# Patient Record
Sex: FEMALE | Race: WHITE | NOT HISPANIC OR LATINO | Employment: OTHER | URBAN - METROPOLITAN AREA
[De-identification: names, ages, dates, MRNs, and addresses within clinical notes are randomized per-mention and may not be internally consistent; named-entity substitution may affect disease eponyms.]

---

## 2017-08-03 PROBLEM — N63.0 LUMP OR MASS IN BREAST: Status: ACTIVE | Noted: 2017-08-03

## 2018-04-22 ENCOUNTER — OFFICE VISIT (OUTPATIENT)
Dept: URGENT CARE | Facility: CLINIC | Age: 63
End: 2018-04-22
Payer: MEDICARE

## 2018-04-22 VITALS
OXYGEN SATURATION: 98 % | HEIGHT: 61 IN | DIASTOLIC BLOOD PRESSURE: 79 MMHG | BODY MASS INDEX: 27.94 KG/M2 | HEART RATE: 84 BPM | WEIGHT: 148 LBS | TEMPERATURE: 98 F | RESPIRATION RATE: 18 BRPM | SYSTOLIC BLOOD PRESSURE: 131 MMHG

## 2018-04-22 DIAGNOSIS — J01.00 ACUTE NON-RECURRENT MAXILLARY SINUSITIS: Primary | ICD-10-CM

## 2018-04-22 PROCEDURE — 99203 OFFICE O/P NEW LOW 30 MIN: CPT | Mod: 25,S$GLB,, | Performed by: PHYSICIAN ASSISTANT

## 2018-04-22 PROCEDURE — 96372 THER/PROPH/DIAG INJ SC/IM: CPT | Mod: S$GLB,,, | Performed by: FAMILY MEDICINE

## 2018-04-22 RX ORDER — MELOXICAM 15 MG/1
TABLET ORAL
COMMUNITY
Start: 2018-04-19 | End: 2019-01-21

## 2018-04-22 RX ORDER — BETAMETHASONE SODIUM PHOSPHATE AND BETAMETHASONE ACETATE 3; 3 MG/ML; MG/ML
6 INJECTION, SUSPENSION INTRA-ARTICULAR; INTRALESIONAL; INTRAMUSCULAR; SOFT TISSUE
Status: COMPLETED | OUTPATIENT
Start: 2018-04-22 | End: 2018-04-22

## 2018-04-22 RX ORDER — MONTELUKAST SODIUM 10 MG/1
TABLET ORAL
COMMUNITY
Start: 2018-04-10 | End: 2019-01-21

## 2018-04-22 RX ADMIN — BETAMETHASONE SODIUM PHOSPHATE AND BETAMETHASONE ACETATE 6 MG: 3; 3 INJECTION, SUSPENSION INTRA-ARTICULAR; INTRALESIONAL; INTRAMUSCULAR; SOFT TISSUE at 02:04

## 2018-04-22 NOTE — PATIENT INSTRUCTIONS
Sinusitis (No Antibiotics)    The sinuses are air-filled spaces within the bones of the face. They connect to the inside of the nose. Sinusitis is an inflammation of the tissue lining the sinus cavity. Sinus inflammation can occur during a cold. It can also be due to allergies to pollens and other particles in the air. It can cause symptoms such as sinus congestion, headache, sore throat, facial swelling and fullness. It may also cause a low-grade fever. No infection is present, and no antibiotic treatment is needed.  Home care  · Drink plenty of water, hot tea, and other liquids. This may help thin mucus. It also may promote sinus drainage.  · Heat may help soothe painful areas of the face. Use a towel soaked in hot water. Or,  the shower and direct the hot spray onto your face. Using a vaporizer along with a menthol rub at night may also help.   · An expectorant containing guaifenesin may help thin the mucus and promote drainage from the sinuses.  · Over-the-counter decongestants may be used unless a similar medicine was prescribed. Nasal sprays work the fastest. Use one that contains phenylephrine or oxymetazoline. First blow the nose gently. Then use the spray. Do not use these medicines more often than directed on the label or symptoms may get worse. You may also use tablets containing pseudoephedrine. Avoid products that combine ingredients, because side effects may be increased. Read labels. You can also ask the pharmacist for help. (NOTE: Persons with high blood pressure should not use decongestants. They can raise blood pressure.)  · Over-the-counter antihistamines may help if allergies contributed to your sinusitis.    · Use acetaminophen or ibuprofen to control pain, unless another pain medicine was prescribed. (If you have chronic liver or kidney disease or ever had a stomach ulcer, talk with your doctor before using these medicines. Aspirin should never be used in anyone under 18 years of age  who is ill with a fever. It may cause severe liver damage.)  · Use nasal rinses or irrigation as instructed by your health care provider.  · Don't smoke. This can worsen symptoms.  Follow-up care  Follow up with your healthcare provider or our staff if you are not improving within the next week.  When to seek medical advice  Call your healthcare provider if any of these occur:  · Green or yellow discharge from the nose or into the throat  · Facial pain or headache becoming more severe  · Stiff neck  · Unusual drowsiness or confusion  · Swelling of the forehead or eyelids  · Vision problems, including blurred or double vision  · Fever of 100.4ºF (38ºC) or higher, or as directed by your healthcare provider  · Seizure  · Breathing problems  · Symptoms not resolving within 10 days  Date Last Reviewed: 4/13/2015  © 7133-8608 [employer]. [address] [place] [address]. All rights reserved. This information is not intended as a substitute for professional medical care. Always follow your healthcare professional's instructions.      Please follow up with your Primary care provider within 2-5 days if your signs and symptoms have not resolved or worsen.     If your condition worsens or fails to improve we recommend that you receive another evaluation at the emergency room immediately or contact your primary medical clinic to discuss your concerns.   You must understand that you have received an Urgent Care treatment only and that you may be released before all of your medical problems are known or treated. You, the patient, will arrange for follow up care as instructed.

## 2018-04-22 NOTE — PROGRESS NOTES
"Subjective:       Patient ID: Roxana Porter is a 63 y.o. female.    Vitals:  height is 5' 1" (1.549 m) and weight is 67.1 kg (148 lb). Her temperature is 97.8 °F (36.6 °C). Her blood pressure is 131/79 and her pulse is 84. Her respiration is 18 and oxygen saturation is 98%.     Chief Complaint: Sinus Problem (Cough)    Sinus Problem   This is a new problem. Episode onset: 3 days. The problem has been gradually worsening since onset. There has been no fever. Associated symptoms include congestion, coughing, headaches, sinus pressure and sneezing. Pertinent negatives include no chills, ear pain, hoarse voice, shortness of breath or sore throat. Past treatments include oral decongestants. The treatment provided no relief.     Review of Systems   Constitution: Negative for chills, fever and malaise/fatigue.   HENT: Positive for congestion, sinus pressure and sneezing. Negative for ear pain, hoarse voice and sore throat.    Eyes: Negative for discharge and redness.   Cardiovascular: Negative for chest pain, dyspnea on exertion and leg swelling.   Respiratory: Positive for cough and sputum production. Negative for shortness of breath and wheezing.    Musculoskeletal: Negative for myalgias.   Gastrointestinal: Negative for abdominal pain and nausea.   Neurological: Positive for headaches.       Objective:      Physical Exam   Constitutional: She is oriented to person, place, and time. She appears well-developed and well-nourished. She is cooperative.  Non-toxic appearance. She does not appear ill. No distress.   HENT:   Head: Normocephalic and atraumatic.   Right Ear: Hearing, tympanic membrane, external ear and ear canal normal.   Left Ear: Hearing, tympanic membrane, external ear and ear canal normal.   Nose: Mucosal edema, rhinorrhea and sinus tenderness present. No nasal deformity. No epistaxis. Right sinus exhibits maxillary sinus tenderness. Right sinus exhibits no frontal sinus tenderness. Left sinus exhibits " maxillary sinus tenderness. Left sinus exhibits no frontal sinus tenderness.   Mouth/Throat: Uvula is midline and mucous membranes are normal. No trismus in the jaw. Normal dentition. No uvula swelling. Posterior oropharyngeal erythema present.   PND noted   Eyes: Conjunctivae and lids are normal. No scleral icterus.   Sclera clear bilat   Neck: Trachea normal, full passive range of motion without pain and phonation normal. Neck supple.   Cardiovascular: Normal rate, regular rhythm, normal heart sounds, intact distal pulses and normal pulses.    Pulmonary/Chest: Effort normal and breath sounds normal. No accessory muscle usage. No respiratory distress. She has no decreased breath sounds. She has no wheezes. She has no rhonchi. She has no rales.   Abdominal: Soft. Normal appearance and bowel sounds are normal. She exhibits no distension. There is no tenderness.   Musculoskeletal: Normal range of motion. She exhibits no edema or deformity.   Neurological: She is alert and oriented to person, place, and time. She exhibits normal muscle tone. Coordination normal.   Skin: Skin is warm, dry and intact. She is not diaphoretic. No pallor.   Psychiatric: She has a normal mood and affect. Her speech is normal and behavior is normal. Judgment and thought content normal. Cognition and memory are normal.   Nursing note and vitals reviewed.      Assessment:       1. Acute non-recurrent maxillary sinusitis        Plan:         Acute non-recurrent maxillary sinusitis  -     betamethasone acetate-betamethasone sodium phosphate injection 6 mg; Inject 1 mL (6 mg total) into the muscle one time.        Sinusitis (No Antibiotics)    The sinuses are air-filled spaces within the bones of the face. They connect to the inside of the nose. Sinusitis is an inflammation of the tissue lining the sinus cavity. Sinus inflammation can occur during a cold. It can also be due to allergies to pollens and other particles in the air. It can cause  symptoms such as sinus congestion, headache, sore throat, facial swelling and fullness. It may also cause a low-grade fever. No infection is present, and no antibiotic treatment is needed.  Home care  · Drink plenty of water, hot tea, and other liquids. This may help thin mucus. It also may promote sinus drainage.  · Heat may help soothe painful areas of the face. Use a towel soaked in hot water. Or,  the shower and direct the hot spray onto your face. Using a vaporizer along with a menthol rub at night may also help.   · An expectorant containing guaifenesin may help thin the mucus and promote drainage from the sinuses.  · Over-the-counter decongestants may be used unless a similar medicine was prescribed. Nasal sprays work the fastest. Use one that contains phenylephrine or oxymetazoline. First blow the nose gently. Then use the spray. Do not use these medicines more often than directed on the label or symptoms may get worse. You may also use tablets containing pseudoephedrine. Avoid products that combine ingredients, because side effects may be increased. Read labels. You can also ask the pharmacist for help. (NOTE: Persons with high blood pressure should not use decongestants. They can raise blood pressure.)  · Over-the-counter antihistamines may help if allergies contributed to your sinusitis.    · Use acetaminophen or ibuprofen to control pain, unless another pain medicine was prescribed. (If you have chronic liver or kidney disease or ever had a stomach ulcer, talk with your doctor before using these medicines. Aspirin should never be used in anyone under 18 years of age who is ill with a fever. It may cause severe liver damage.)  · Use nasal rinses or irrigation as instructed by your health care provider.  · Don't smoke. This can worsen symptoms.  Follow-up care  Follow up with your healthcare provider or our staff if you are not improving within the next week.  When to seek medical advice  Call your  healthcare provider if any of these occur:  · Green or yellow discharge from the nose or into the throat  · Facial pain or headache becoming more severe  · Stiff neck  · Unusual drowsiness or confusion  · Swelling of the forehead or eyelids  · Vision problems, including blurred or double vision  · Fever of 100.4ºF (38ºC) or higher, or as directed by your healthcare provider  · Seizure  · Breathing problems  · Symptoms not resolving within 10 days  Date Last Reviewed: 4/13/2015  © 5742-1167 [employer]. [address] [place] [address]. All rights reserved. This information is not intended as a substitute for professional medical care. Always follow your healthcare professional's instructions.      Please follow up with your Primary care provider within 2-5 days if your signs and symptoms have not resolved or worsen.     If your condition worsens or fails to improve we recommend that you receive another evaluation at the emergency room immediately or contact your primary medical clinic to discuss your concerns.   You must understand that you have received an Urgent Care treatment only and that you may be released before all of your medical problems are known or treated. You, the patient, will arrange for follow up care as instructed.

## 2018-04-25 ENCOUNTER — TELEPHONE (OUTPATIENT)
Dept: URGENT CARE | Facility: CLINIC | Age: 63
End: 2018-04-25

## 2019-01-21 PROBLEM — C50.919 BREAST CANCER IN FEMALE: Status: ACTIVE | Noted: 2019-01-21

## 2020-02-06 ENCOUNTER — OFFICE VISIT (OUTPATIENT)
Dept: URGENT CARE | Facility: CLINIC | Age: 65
End: 2020-02-06
Payer: MEDICARE

## 2020-02-06 VITALS
OXYGEN SATURATION: 98 % | SYSTOLIC BLOOD PRESSURE: 144 MMHG | HEIGHT: 61 IN | WEIGHT: 139 LBS | DIASTOLIC BLOOD PRESSURE: 67 MMHG | TEMPERATURE: 98 F | RESPIRATION RATE: 16 BRPM | BODY MASS INDEX: 26.24 KG/M2 | HEART RATE: 88 BPM

## 2020-02-06 DIAGNOSIS — B96.89 ACUTE BACTERIAL BRONCHITIS: Primary | ICD-10-CM

## 2020-02-06 DIAGNOSIS — R05.9 COUGH: ICD-10-CM

## 2020-02-06 DIAGNOSIS — J02.9 SORE THROAT: ICD-10-CM

## 2020-02-06 DIAGNOSIS — R51.9 HEADACHE, UNSPECIFIED HEADACHE TYPE: ICD-10-CM

## 2020-02-06 DIAGNOSIS — Z20.828 EXPOSURE TO THE FLU: ICD-10-CM

## 2020-02-06 DIAGNOSIS — J20.8 ACUTE BACTERIAL BRONCHITIS: Primary | ICD-10-CM

## 2020-02-06 DIAGNOSIS — R09.81 NASAL CONGESTION: ICD-10-CM

## 2020-02-06 DIAGNOSIS — R03.0 ELEVATED BLOOD PRESSURE READING: ICD-10-CM

## 2020-02-06 DIAGNOSIS — R09.82 POST-NASAL DRIP: ICD-10-CM

## 2020-02-06 DIAGNOSIS — J34.89 SINUS PRESSURE: ICD-10-CM

## 2020-02-06 DIAGNOSIS — R06.7 SNEEZING: ICD-10-CM

## 2020-02-06 LAB
CTP QC/QA: YES
FLUAV AG NPH QL: NEGATIVE
FLUBV AG NPH QL: NEGATIVE

## 2020-02-06 PROCEDURE — 99214 PR OFFICE/OUTPT VISIT, EST, LEVL IV, 30-39 MIN: ICD-10-PCS | Mod: 25,S$GLB,, | Performed by: NURSE PRACTITIONER

## 2020-02-06 PROCEDURE — 99214 OFFICE O/P EST MOD 30 MIN: CPT | Mod: 25,S$GLB,, | Performed by: NURSE PRACTITIONER

## 2020-02-06 PROCEDURE — 87804 INFLUENZA ASSAY W/OPTIC: CPT | Mod: QW,S$GLB,, | Performed by: NURSE PRACTITIONER

## 2020-02-06 PROCEDURE — 87804 POCT INFLUENZA A/B: ICD-10-PCS | Mod: QW,S$GLB,, | Performed by: NURSE PRACTITIONER

## 2020-02-06 RX ORDER — AZITHROMYCIN 250 MG/1
TABLET, FILM COATED ORAL
Qty: 6 TABLET | Refills: 0 | Status: SHIPPED | OUTPATIENT
Start: 2020-02-06 | End: 2020-02-11

## 2020-02-06 RX ORDER — PROMETHAZINE HYDROCHLORIDE AND DEXTROMETHORPHAN HYDROBROMIDE 6.25; 15 MG/5ML; MG/5ML
5 SYRUP ORAL EVERY 6 HOURS PRN
Qty: 100 ML | Refills: 0 | Status: SHIPPED | OUTPATIENT
Start: 2020-02-06 | End: 2020-02-13

## 2020-02-06 RX ORDER — CYANOCOBALAMIN (VITAMIN B-12) 1000MCG/ML
1000 DROPS ORAL
COMMUNITY

## 2020-02-06 RX ORDER — OMEGA-3 FATTY ACIDS 1000 MG
2 CAPSULE ORAL
COMMUNITY

## 2020-02-06 RX ORDER — CALCIUM CARBONATE 600 MG
600 TABLET ORAL
COMMUNITY

## 2020-02-06 RX ORDER — BENZONATATE 100 MG/1
100 CAPSULE ORAL 3 TIMES DAILY PRN
Qty: 20 CAPSULE | Refills: 0 | Status: SHIPPED | OUTPATIENT
Start: 2020-02-06 | End: 2020-02-13

## 2020-02-06 RX ORDER — AZELASTINE 1 MG/ML
1 SPRAY, METERED NASAL 2 TIMES DAILY
Qty: 30 ML | Refills: 0 | Status: SHIPPED | OUTPATIENT
Start: 2020-02-06 | End: 2020-02-20

## 2020-02-06 RX ORDER — CALCIUM CARBONATE/VITAMIN D3 500-10/5ML
400 LIQUID (ML) ORAL
COMMUNITY

## 2020-02-06 RX ORDER — CELECOXIB 200 MG/1
CAPSULE ORAL
COMMUNITY
Start: 2020-01-20

## 2020-02-06 RX ORDER — OMEPRAZOLE 20 MG/1
20 TABLET, DELAYED RELEASE ORAL
COMMUNITY

## 2020-02-06 RX ORDER — LORATADINE 10 MG/1
10 TABLET ORAL
COMMUNITY

## 2020-02-06 NOTE — PATIENT INSTRUCTIONS
Always follow your healthcare professional's instructions.    You have received urgent care diagnosis and treatment and you may be released before all of your medical problems are known or treated. Unless you have been given a referral, you (the patient), will arrange for follow-up care as instructed.     Please follow up with your primary care provider within 5-7 days if your signs and symptoms have not resolved or have worsen.     If your condition worsens or fails to improve, I recommend that you receive another evaluation in the emergency room immediately or contact your primary care office to discuss your concerns.     You have been diagnosed with ACUTE BACTERIAL BRONCHITIS      Bronchitis is an infection of the air passages (bronchial tubes) in your lungs. It often occurs when you have a cold. For most patients with acute bronchitis, symptoms are self-limited, resolving in about one to three weeks.This illness is contagious during the first few days and is spread through the air by coughing and sneezing, or by direct contact (touching the sick person and then touching your own eyes, nose, or mouth).    Symptoms of bronchitis include cough with mucus (phlegm) and low-grade fever. The average cough lasts about 18 days. Mild cases can be treated with simple home remedies. More severe infection is treated with an antibiotic.    Home care  Follow these guidelines when caring for yourself at home:  · If your symptoms are severe, rest at home for the first 2 to 3 days. When you go back to your usual activities, don't let yourself get too tired.  · Do not smoke. Also avoid being exposed to secondhand smoke.  · You may use over-the-counter medicines to control fever or pain, unless another medicine was prescribed. (Note: If you have chronic liver or kidney disease or have ever had a stomach ulcer or gastrointestinal bleeding, talk with your healthcare provider before using these medicines. Also talk to your provider if  you are taking medicine to prevent blood clots.) Aspirin should never be given to anyone younger than 18 years of age who is ill with a viral infection or fever. It may cause severe liver or brain damage.  · Your appetite may be poor, so a light diet is fine. Avoid dehydration by drinking 6 to 8 glasses of fluids per day (such as water, soft drinks, sports drinks, juices, tea, or soup). Extra fluids will help loosen secretions in the nose and lungs.  · Over-the-counter cough, cold, and sore-throat medicines will not shorten the length of the illness, but they may be helpful to reduce symptoms. (Note: Do not use decongestants if you have high blood pressure.)  · Finish all antibiotic medicine. Do this even if you are feeling better after only a few days.  · Wash your hands well with soap and warm water to help prevent spreading infection.    Follow-up care  Follow up with your healthcare provider, or as advised. If you had an X-ray or ECG (electrocardiogram), a specialist will review it. You will be notified of any new findings that may affect your care.  Note: If you are age 65 or older, or if you have a chronic lung disease or condition that affects your immune system, or you smoke, talk to your healthcare provider about having pneumococcal vaccinations and a yearly influenza vaccination (flu shot).    When to seek medical advice  Call your healthcare provider right away if any of these occur:  · Fever of 100.4°F (38°C) or higher  · Coughing up increased amounts of colored sputum  · Weakness, drowsiness, headache, facial pain, ear pain, or a stiff neck    Call 911, or get immediate medical care  Contact emergency services right away if any of these occur.  · Coughing up blood  · Worsening weakness, drowsiness, headache, or stiff neck  · Trouble breathing, wheezing, or pain with breathing    You have been given an antibiotic to treat your condition today.  Even if your symptoms improve, please complete the  medication as directed on the bottle.     Antibiotics work to destroy bacteria. They may also alter the good bacteria in your gut. Use probiotics and/or high culture yogurt about two hours apart from the antibiotic and about one week after the antibiotic to replace the good bacteria and prevent negative gastro intestinal consequences.    Common antibiotic treatments:  Cefdinir, is a third-generation oral cephalosporin, may cause loose, red stools when administered with products that contain iron. Avoid excessive exposure to sunlight or tanning beds. Use an SPF 30 or higher sunblock when outside and wear protective clothing as azithromycin can make you sunburn more easily.     If you have questions about whether you should take your medications with food, you should read the medication instructions provided to you with your medication, or contact your pharmacy.    If you are female and on BCP use additional methods to prevent pregnancy while on antibiotics and for one cycle after.     Symptom management:    Cough Allergy Symptoms Asthma   Tessalon Perles are a non-narcotic cough medicine. It works by numbing the throat and lungs, making the cough reflex less active, it is used to relieve coughing during the day. If you have been given Phenergan DM cough syrup, you do NOT need to take both medications at the same time.    Phenergan DM is a combination medication that is used to treat cough. Phenergan DM works like an antihistamine and cough suppressant. This medication will make you sleepy like Benadryl, have a drying effect, and act on a part of the brain (cough center) to reduce the need to cough. DO NOT take Benadryl and Phenergan together. You may take over-the-counter claritin, zyrtec, allegra, or xyzal as directed, these are antihistamines that will work to dry up secretions/mucus. Antihistamines work to block the effects of a certain natural substance (histamine), which causes allergy symptoms.    OR    Use  "over-the-counter Flonase (a nasal steroid) or prescribed Azelastine (a nasal antihistamine) to treat runny nose, sneezing, itchy nose, nasal congestion, and postnasal drip.    Proper administration is to "look down at your toes and aim for your nose". The goal is to aim for your nasolabial folds, the creases in your nose. If you feel the medication drip down your throat, you have NOT administered it correctly. If you can "smell the roses" (floral scent), then you have administered it correctly. If you have a history of asthma, expressed a concern about wheezing or have been told you were wheezing during your exam today, you are being given Albuterol. Albuterol is a bronchodilator that relaxes muscles in the airways and increases air flow to the lungs; it is used to treat or prevent bronchospasm, or narrowing of the airways in the lungs.     If you have a history of asthma, expressed a concern about wheezing or have been told you were wheezing during your exam today and are NOT being prescribed Albuterol that is because you have insured me that you have an adequate supply of the drug at home.          Why didn't I get a steroid shot or a medrol dose pack?  It is suggested NOT to use glucocorticoids in the treatment of acute bacterial bronchitis. When given in addition to antibiotics, oral steroids may shorten the time to symptom resolution or improvement (so will the above recommendations). The benefits of steroids are small and, unlike topical glucocorticoids, systemic glucocorticoids possess a significant side effect profile.     Major side effects include:     Effects immunity predisposing you to getting a more severe infection and increases your white blood cell count.Skin consequences: Skin thinning and ecchymoses, Cushingoid appearance (rounded face), acne, weight gain, mild hirsutism, facial erythema, and striae.   Eye consequences: Cataracts, increased intraocular pressure, exophthalmos.    Cardiovascular " consequences: Fluid retention (increase in blood pressure), premature atherosclerotic disease, and arrhythmias.    GI consequences: Increased risk for adverse gastrointestinal effects, such as gastritis, ulcer formation, and gastrointestinal bleeding   Bone and muscle consequences: Osteoporosis, osteonecrosis, and myopathy.   Neuropsychiatric effects: Mood disorders, psychosis, memory impairment, and    Metabolic and Endocrine consequences: Suppress the hypothalamic-pituitary-adrenal (HPA) axis and increase blood sugar.   Young children -- Growth impairment        Can I have a shot instead of pills?  No. I have diagnosed you with acute bacterial bronchitis and I want you to have a FULL course of antibiotics and not just a one time antibiotic shot. I have discussed above why you did not receive a steroid shot-this will only change if you were in respiratory distress    Elevated Blood Pressure    Your blood pressure was elevated during your visit to the urgent care.     It was not so high that immediate care was needed, but it is recommended that you monitor your blood pressure over the next week or two to make sure that it is not staying elevated.      Please keep a log/diary of your blood pressure readings and notify your primary care physician. Keep all that information and take it with you to see your primary care physician.     If you are taking antihypertensives, please continue your medication as prescribed.      If your blood pressure is consistently above 140/90 you will need to follow-up with your primary care physician.   more quickly.     According to ACEP guidelines, workup and treatment of hypertension in asymptomatic patient is not warranted in the ED: (1) Initiating treatment for asymptomatic hypertension in the ED is not necessary when patients have follow-up; (2) Rapidly lowering blood pressure in asymptomatic patients in the ED is unnecessary and may be harmful in some patients; (3) When ED  treatment for asymptomatic hypertension is initiated, blood pressure management should attempt to gradually lower blood pressure and should not be expected to be normalized during the initial ED visit.        Call your doctor immediately or seek emergency care if you have any of the following:  · Chest pain or shortness of breath (call 911)  · Moderate to severe headache  · Weakness in the muscles of your face, arms, or legs  · Trouble speaking  · Extreme drowsiness  · Confusion  · Fainting or dizziness  · Pulsating or rushing sound in your ears  · Unexplained nosebleed  · Weakness, tingling, or numbness of your face, arms, or legs  · Change in vision  · Blood pressure measured at home that is greater than 180/110     Changes You Can Make to Manage High Blood Pressure:    Healthy Diet  Shaking the Salt Habit  Potassium: Foods that are rich in potassium are important in managing high blood pressure (HBP or hypertension) because potassium lessens the effects of sodium.  Alcohol: Drinking too much alcohol can raise your blood pressure.   Getting Active  Managing Stress  Managing Weight  NOT Smoking  Medications: When blood pressure is high, high blood pressure medication may be an important part of your treatment.  OTC Medications: See below for some medications to avoid.  Partnering With Your Doctor: According to American Heart Association recommendations, people being treated but still not reaching their target blood pressure -- should be a healthcare priority. A key to better control is a good doctor-patient partnership.    People with high blood pressure should be aware that the use of decongestants may raise blood pressure or interfere with the effectiveness of some prescribed blood pressure medications. Be aware of over-the-counter cold and flu preparations that contain decongestants such as:    Oxymetazoline  Phenylephrine  Pseudoephedrine    Your provider discussed your plan of care with you during your  physical exam. It was reviewed once more by the provider when giving you an after visit summary. If the patient is a minor, the discharge instructions were discussed with an adult and that adult acknowledge their understanding of the provider's teaching.

## 2020-02-06 NOTE — PROGRESS NOTES
"Subjective:       Patient ID: Roxana Porter is a 65 y.o. female.    Vitals:  height is 5' 1" (1.549 m) and weight is 63 kg (139 lb). Her oral temperature is 98.2 °F (36.8 °C). Her blood pressure is 144/67 (abnormal) and her pulse is 88. Her respiration is 16 and oxygen saturation is 98%.     Chief Complaint: Sinus Problem and Sore Throat    Sinus Problem   This is a new problem. Episode onset: 7 days - 01/30/20. The problem has been waxing and waning since onset. There has been no fever. Her pain is at a severity of 5/10. The pain is moderate. Associated symptoms include congestion, coughing, headaches, sinus pressure, sneezing and a sore throat. Pertinent negatives include no chills, diaphoresis, ear pain or shortness of breath. Treatments tried: OTC sinus medication, mucinex. The treatment provided moderate relief.   Sore Throat    This is a new problem. Episode onset: 7 days. The problem has been waxing and waning. Neither side of throat is experiencing more pain than the other. There has been no fever. The pain is at a severity of 8/10. The pain is severe. Associated symptoms include congestion, coughing and headaches. Pertinent negatives include no ear pain, shortness of breath, stridor or vomiting. Treatments tried: OTC sinus medication, mucinex.       Constitution: Negative for chills, sweating, fatigue and fever.   HENT: Positive for congestion, postnasal drip, sinus pain, sinus pressure and sore throat. Negative for ear pain and voice change.    Neck: Negative for painful lymph nodes.   Eyes: Negative for eye redness.   Respiratory: Positive for cough and sputum production. Negative for chest tightness, bloody sputum, COPD, shortness of breath, stridor, wheezing and asthma.    Gastrointestinal: Negative for nausea and vomiting.   Musculoskeletal: Negative for muscle ache.   Skin: Negative for rash.   Allergic/Immunologic: Positive for sneezing. Negative for seasonal allergies and asthma. "   Neurological: Positive for headaches.   Hematologic/Lymphatic: Negative for swollen lymph nodes.       Objective:      Physical Exam   Constitutional: She is oriented to person, place, and time. She appears well-developed and well-nourished. She is cooperative.  Non-toxic appearance. She does not have a sickly appearance. She does not appear ill. No distress.   HENT:   Head: Normocephalic and atraumatic.   Right Ear: Hearing, external ear and ear canal normal. A middle ear effusion is present.   Left Ear: Hearing, external ear and ear canal normal. A middle ear effusion is present.   Nose: Mucosal edema present. No rhinorrhea or nasal deformity. No epistaxis. Right sinus exhibits maxillary sinus tenderness. Right sinus exhibits no frontal sinus tenderness. Left sinus exhibits maxillary sinus tenderness. Left sinus exhibits no frontal sinus tenderness.   Mouth/Throat: Uvula is midline and mucous membranes are normal. No trismus in the jaw. Normal dentition. No uvula swelling. Posterior oropharyngeal erythema present. No oropharyngeal exudate or posterior oropharyngeal edema.   Eyes: Conjunctivae and lids are normal. No scleral icterus.   Neck: Trachea normal, full passive range of motion without pain and phonation normal. Neck supple. No neck rigidity. No edema and no erythema present.   Cardiovascular: Normal rate, regular rhythm, normal heart sounds, intact distal pulses and normal pulses.   Pulmonary/Chest: Effort normal and breath sounds normal. No respiratory distress. She has no decreased breath sounds. She has no rhonchi.   Abdominal: Normal appearance.   Musculoskeletal: Normal range of motion. She exhibits no edema or deformity.   Lymphadenopathy:     She has cervical adenopathy.        Right cervical: Superficial cervical adenopathy present.        Left cervical: Superficial cervical adenopathy present.   Neurological: She is alert and oriented to person, place, and time. She exhibits normal muscle tone.  Coordination normal.   Skin: Skin is warm, dry, intact, not diaphoretic and not pale.   Psychiatric: She has a normal mood and affect. Her speech is normal and behavior is normal. Judgment and thought content normal. Cognition and memory are normal.   Nursing note and vitals reviewed.        Assessment:       1. Acute bacterial bronchitis    2. Cough    3. Elevated blood pressure reading    4. Nasal congestion    5. Headache, unspecified headache type    6. Sinus pressure    7. Sneezing    8. Sore throat    9. Post-nasal drip        Plan:         Acute bacterial bronchitis  -     azithromycin (Z-MANUEL) 250 MG tablet; Take 2 tablets (500 mg total) by mouth once daily for 1 day, THEN 1 tablet (250 mg total) once daily for 4 days. One Z-Pack as directed.  Dispense: 6 tablet; Refill: 0  -     azelastine (ASTELIN) 137 mcg (0.1 %) nasal spray; 1 spray (137 mcg total) by Nasal route 2 (two) times daily. for 14 days  Dispense: 30 mL; Refill: 0    Cough  -     POCT Influenza A/B  -     benzonatate (TESSALON) 100 MG capsule; Take 1 capsule (100 mg total) by mouth 3 (three) times daily as needed for Cough.  Dispense: 20 capsule; Refill: 0  -     promethazine-dextromethorphan (PROMETHAZINE-DM) 6.25-15 mg/5 mL Syrp; Take 5 mLs by mouth every 6 (six) hours as needed (May take every 4 hours, PRN. DoNOT take more than 30 mL in 24 hours.).  Dispense: 100 mL; Refill: 0    Elevated blood pressure reading    Nasal congestion    Headache, unspecified headache type    Sinus pressure    Sneezing    Sore throat  -     diphenhydrAMINE-aluminum-magnesium hydroxide-simethicone-lidocaine HCl 2%; Swish and swallow 10 mLs every 4 (four) hours as needed.  Dispense: 100 mL; Refill: 0    Post-nasal drip         Results for orders placed or performed in visit on 02/06/20   POCT Influenza A/B   Result Value Ref Range    Rapid Influenza A Ag Negative Negative    Rapid Influenza B Ag Negative Negative     Acceptable Yes      Patient  Instructions     Always follow your healthcare professional's instructions.    You have received urgent care diagnosis and treatment and you may be released before all of your medical problems are known or treated. Unless you have been given a referral, you (the patient), will arrange for follow-up care as instructed.     Please follow up with your primary care provider within 5-7 days if your signs and symptoms have not resolved or have worsen.     If your condition worsens or fails to improve, I recommend that you receive another evaluation in the emergency room immediately or contact your primary care office to discuss your concerns.     You have been diagnosed with ACUTE BACTERIAL BRONCHITIS      Bronchitis is an infection of the air passages (bronchial tubes) in your lungs. It often occurs when you have a cold. For most patients with acute bronchitis, symptoms are self-limited, resolving in about one to three weeks.This illness is contagious during the first few days and is spread through the air by coughing and sneezing, or by direct contact (touching the sick person and then touching your own eyes, nose, or mouth).    Symptoms of bronchitis include cough with mucus (phlegm) and low-grade fever. The average cough lasts about 18 days. Mild cases can be treated with simple home remedies. More severe infection is treated with an antibiotic.    Home care  Follow these guidelines when caring for yourself at home:  · If your symptoms are severe, rest at home for the first 2 to 3 days. When you go back to your usual activities, don't let yourself get too tired.  · Do not smoke. Also avoid being exposed to secondhand smoke.  · You may use over-the-counter medicines to control fever or pain, unless another medicine was prescribed. (Note: If you have chronic liver or kidney disease or have ever had a stomach ulcer or gastrointestinal bleeding, talk with your healthcare provider before using these medicines. Also talk to  your provider if you are taking medicine to prevent blood clots.) Aspirin should never be given to anyone younger than 18 years of age who is ill with a viral infection or fever. It may cause severe liver or brain damage.  · Your appetite may be poor, so a light diet is fine. Avoid dehydration by drinking 6 to 8 glasses of fluids per day (such as water, soft drinks, sports drinks, juices, tea, or soup). Extra fluids will help loosen secretions in the nose and lungs.  · Over-the-counter cough, cold, and sore-throat medicines will not shorten the length of the illness, but they may be helpful to reduce symptoms. (Note: Do not use decongestants if you have high blood pressure.)  · Finish all antibiotic medicine. Do this even if you are feeling better after only a few days.  · Wash your hands well with soap and warm water to help prevent spreading infection.    Follow-up care  Follow up with your healthcare provider, or as advised. If you had an X-ray or ECG (electrocardiogram), a specialist will review it. You will be notified of any new findings that may affect your care.  Note: If you are age 65 or older, or if you have a chronic lung disease or condition that affects your immune system, or you smoke, talk to your healthcare provider about having pneumococcal vaccinations and a yearly influenza vaccination (flu shot).    When to seek medical advice  Call your healthcare provider right away if any of these occur:  · Fever of 100.4°F (38°C) or higher  · Coughing up increased amounts of colored sputum  · Weakness, drowsiness, headache, facial pain, ear pain, or a stiff neck    Call 911, or get immediate medical care  Contact emergency services right away if any of these occur.  · Coughing up blood  · Worsening weakness, drowsiness, headache, or stiff neck  · Trouble breathing, wheezing, or pain with breathing    You have been given an antibiotic to treat your condition today.  Even if your symptoms improve, please  complete the medication as directed on the bottle.     Antibiotics work to destroy bacteria. They may also alter the good bacteria in your gut. Use probiotics and/or high culture yogurt about two hours apart from the antibiotic and about one week after the antibiotic to replace the good bacteria and prevent negative gastro intestinal consequences.    Common antibiotic treatments:  Cefdinir, is a third-generation oral cephalosporin, may cause loose, red stools when administered with products that contain iron. Avoid excessive exposure to sunlight or tanning beds. Use an SPF 30 or higher sunblock when outside and wear protective clothing as azithromycin can make you sunburn more easily.     If you have questions about whether you should take your medications with food, you should read the medication instructions provided to you with your medication, or contact your pharmacy.    If you are female and on BCP use additional methods to prevent pregnancy while on antibiotics and for one cycle after.     Symptom management:    Cough Allergy Symptoms Asthma   Tessalon Perles are a non-narcotic cough medicine. It works by numbing the throat and lungs, making the cough reflex less active, it is used to relieve coughing during the day. If you have been given Phenergan DM cough syrup, you do NOT need to take both medications at the same time.    Phenergan DM is a combination medication that is used to treat cough. Phenergan DM works like an antihistamine and cough suppressant. This medication will make you sleepy like Benadryl, have a drying effect, and act on a part of the brain (cough center) to reduce the need to cough. DO NOT take Benadryl and Phenergan together. You may take over-the-counter claritin, zyrtec, allegra, or xyzal as directed, these are antihistamines that will work to dry up secretions/mucus. Antihistamines work to block the effects of a certain natural substance (histamine), which causes allergy  "symptoms.    OR    Use over-the-counter Flonase (a nasal steroid) or prescribed Azelastine (a nasal antihistamine) to treat runny nose, sneezing, itchy nose, nasal congestion, and postnasal drip.    Proper administration is to "look down at your toes and aim for your nose". The goal is to aim for your nasolabial folds, the creases in your nose. If you feel the medication drip down your throat, you have NOT administered it correctly. If you can "smell the roses" (floral scent), then you have administered it correctly. If you have a history of asthma, expressed a concern about wheezing or have been told you were wheezing during your exam today, you are being given Albuterol. Albuterol is a bronchodilator that relaxes muscles in the airways and increases air flow to the lungs; it is used to treat or prevent bronchospasm, or narrowing of the airways in the lungs.     If you have a history of asthma, expressed a concern about wheezing or have been told you were wheezing during your exam today and are NOT being prescribed Albuterol that is because you have insured me that you have an adequate supply of the drug at home.          Why didn't I get a steroid shot or a medrol dose pack?  It is suggested NOT to use glucocorticoids in the treatment of acute bacterial bronchitis. When given in addition to antibiotics, oral steroids may shorten the time to symptom resolution or improvement (so will the above recommendations). The benefits of steroids are small and, unlike topical glucocorticoids, systemic glucocorticoids possess a significant side effect profile.     Major side effects include:     Effects immunity predisposing you to getting a more severe infection and increases your white blood cell count.Skin consequences: Skin thinning and ecchymoses, Cushingoid appearance (rounded face), acne, weight gain, mild hirsutism, facial erythema, and striae.   Eye consequences: Cataracts, increased intraocular pressure, " exophthalmos.    Cardiovascular consequences: Fluid retention (increase in blood pressure), premature atherosclerotic disease, and arrhythmias.    GI consequences: Increased risk for adverse gastrointestinal effects, such as gastritis, ulcer formation, and gastrointestinal bleeding   Bone and muscle consequences: Osteoporosis, osteonecrosis, and myopathy.   Neuropsychiatric effects: Mood disorders, psychosis, memory impairment, and    Metabolic and Endocrine consequences: Suppress the hypothalamic-pituitary-adrenal (HPA) axis and increase blood sugar.   Young children -- Growth impairment        Can I have a shot instead of pills?  No. I have diagnosed you with acute bacterial bronchitis and I want you to have a FULL course of antibiotics and not just a one time antibiotic shot. I have discussed above why you did not receive a steroid shot-this will only change if you were in respiratory distress    Elevated Blood Pressure    Your blood pressure was elevated during your visit to the urgent care.     It was not so high that immediate care was needed, but it is recommended that you monitor your blood pressure over the next week or two to make sure that it is not staying elevated.      Please keep a log/diary of your blood pressure readings and notify your primary care physician. Keep all that information and take it with you to see your primary care physician.     If you are taking antihypertensives, please continue your medication as prescribed.      If your blood pressure is consistently above 140/90 you will need to follow-up with your primary care physician.   more quickly.     According to ACEP guidelines, workup and treatment of hypertension in asymptomatic patient is not warranted in the ED: (1) Initiating treatment for asymptomatic hypertension in the ED is not necessary when patients have follow-up; (2) Rapidly lowering blood pressure in asymptomatic patients in the ED is unnecessary and may be harmful  in some patients; (3) When ED treatment for asymptomatic hypertension is initiated, blood pressure management should attempt to gradually lower blood pressure and should not be expected to be normalized during the initial ED visit.        Call your doctor immediately or seek emergency care if you have any of the following:  · Chest pain or shortness of breath (call 911)  · Moderate to severe headache  · Weakness in the muscles of your face, arms, or legs  · Trouble speaking  · Extreme drowsiness  · Confusion  · Fainting or dizziness  · Pulsating or rushing sound in your ears  · Unexplained nosebleed  · Weakness, tingling, or numbness of your face, arms, or legs  · Change in vision  · Blood pressure measured at home that is greater than 180/110     Changes You Can Make to Manage High Blood Pressure:    Healthy Diet  Shaking the Salt Habit  Potassium: Foods that are rich in potassium are important in managing high blood pressure (HBP or hypertension) because potassium lessens the effects of sodium.  Alcohol: Drinking too much alcohol can raise your blood pressure.   Getting Active  Managing Stress  Managing Weight  NOT Smoking  Medications: When blood pressure is high, high blood pressure medication may be an important part of your treatment.  OTC Medications: See below for some medications to avoid.  Partnering With Your Doctor: According to American Heart Association recommendations, people being treated but still not reaching their target blood pressure -- should be a healthcare priority. A key to better control is a good doctor-patient partnership.    People with high blood pressure should be aware that the use of decongestants may raise blood pressure or interfere with the effectiveness of some prescribed blood pressure medications. Be aware of over-the-counter cold and flu preparations that contain decongestants such as:    Oxymetazoline  Phenylephrine  Pseudoephedrine    Your provider discussed your plan of  care with you during your physical exam. It was reviewed once more by the provider when giving you an after visit summary. If the patient is a minor, the discharge instructions were discussed with an adult and that adult acknowledge their understanding of the provider's teaching.        Pt.'s  presents today and is positive for flu A.

## 2020-02-09 ENCOUNTER — TELEPHONE (OUTPATIENT)
Dept: URGENT CARE | Facility: CLINIC | Age: 65
End: 2020-02-09

## 2020-02-09 NOTE — TELEPHONE ENCOUNTER
Courtesy call DOS 2/6/2020.  Patient states she is not feeling well. Patient could return to urgent care or follow up PCP.

## 2020-02-10 RX ORDER — OSELTAMIVIR PHOSPHATE 75 MG/1
75 CAPSULE ORAL 2 TIMES DAILY
Qty: 10 CAPSULE | Refills: 0 | Status: SHIPPED | OUTPATIENT
Start: 2020-02-10 | End: 2020-02-15

## 2021-07-27 ENCOUNTER — OFFICE VISIT (OUTPATIENT)
Dept: URGENT CARE | Facility: CLINIC | Age: 66
End: 2021-07-27
Payer: MEDICARE

## 2021-07-27 VITALS
HEART RATE: 102 BPM | WEIGHT: 134 LBS | RESPIRATION RATE: 16 BRPM | HEIGHT: 62 IN | OXYGEN SATURATION: 98 % | DIASTOLIC BLOOD PRESSURE: 65 MMHG | BODY MASS INDEX: 24.66 KG/M2 | TEMPERATURE: 99 F | SYSTOLIC BLOOD PRESSURE: 145 MMHG

## 2021-07-27 DIAGNOSIS — U07.1 COVID-19: Primary | ICD-10-CM

## 2021-07-27 DIAGNOSIS — R09.82 POSTNASAL DRIP: ICD-10-CM

## 2021-07-27 DIAGNOSIS — R05.9 COUGH: ICD-10-CM

## 2021-07-27 LAB
CTP QC/QA: YES
SARS-COV-2 RDRP RESP QL NAA+PROBE: POSITIVE

## 2021-07-27 PROCEDURE — U0002: ICD-10-PCS | Mod: QW,S$GLB,, | Performed by: PHYSICIAN ASSISTANT

## 2021-07-27 PROCEDURE — 99214 OFFICE O/P EST MOD 30 MIN: CPT | Mod: S$GLB,,, | Performed by: PHYSICIAN ASSISTANT

## 2021-07-27 PROCEDURE — 3008F PR BODY MASS INDEX (BMI) DOCUMENTED: ICD-10-PCS | Mod: CPTII,S$GLB,, | Performed by: PHYSICIAN ASSISTANT

## 2021-07-27 PROCEDURE — 1159F PR MEDICATION LIST DOCUMENTED IN MEDICAL RECORD: ICD-10-PCS | Mod: CPTII,S$GLB,, | Performed by: PHYSICIAN ASSISTANT

## 2021-07-27 PROCEDURE — 3008F BODY MASS INDEX DOCD: CPT | Mod: CPTII,S$GLB,, | Performed by: PHYSICIAN ASSISTANT

## 2021-07-27 PROCEDURE — U0002 COVID-19 LAB TEST NON-CDC: HCPCS | Mod: QW,S$GLB,, | Performed by: PHYSICIAN ASSISTANT

## 2021-07-27 PROCEDURE — 1160F RVW MEDS BY RX/DR IN RCRD: CPT | Mod: CPTII,S$GLB,, | Performed by: PHYSICIAN ASSISTANT

## 2021-07-27 PROCEDURE — 1160F PR REVIEW ALL MEDS BY PRESCRIBER/CLIN PHARMACIST DOCUMENTED: ICD-10-PCS | Mod: CPTII,S$GLB,, | Performed by: PHYSICIAN ASSISTANT

## 2021-07-27 PROCEDURE — 99214 PR OFFICE/OUTPT VISIT, EST, LEVL IV, 30-39 MIN: ICD-10-PCS | Mod: S$GLB,,, | Performed by: PHYSICIAN ASSISTANT

## 2021-07-27 PROCEDURE — 1159F MED LIST DOCD IN RCRD: CPT | Mod: CPTII,S$GLB,, | Performed by: PHYSICIAN ASSISTANT

## 2021-07-27 RX ORDER — LOSARTAN POTASSIUM 25 MG/1
TABLET ORAL
COMMUNITY
Start: 2021-02-11

## 2021-07-27 RX ORDER — ROSUVASTATIN CALCIUM 10 MG/1
1 TABLET, COATED ORAL DAILY
COMMUNITY
Start: 2021-04-15

## 2021-07-27 RX ORDER — MONTELUKAST SODIUM 10 MG/1
TABLET ORAL
COMMUNITY

## 2021-07-27 RX ORDER — LEVOCETIRIZINE DIHYDROCHLORIDE 5 MG/1
TABLET, FILM COATED ORAL
COMMUNITY
Start: 2021-05-20 | End: 2021-07-27

## 2021-08-02 ENCOUNTER — INFUSION (OUTPATIENT)
Dept: INFECTIOUS DISEASES | Facility: HOSPITAL | Age: 66
End: 2021-08-02
Payer: MEDICARE

## 2021-08-02 VITALS
OXYGEN SATURATION: 98 % | HEIGHT: 60 IN | RESPIRATION RATE: 22 BRPM | BODY MASS INDEX: 26.31 KG/M2 | SYSTOLIC BLOOD PRESSURE: 108 MMHG | HEART RATE: 86 BPM | WEIGHT: 134 LBS | DIASTOLIC BLOOD PRESSURE: 55 MMHG | TEMPERATURE: 99 F

## 2021-08-02 DIAGNOSIS — U07.1 COVID-19: Primary | ICD-10-CM

## 2021-08-02 PROCEDURE — M0243 CASIRIVI AND IMDEVI INFUSION: HCPCS | Performed by: INTERNAL MEDICINE

## 2021-08-02 PROCEDURE — 25000003 PHARM REV CODE 250: Performed by: INTERNAL MEDICINE

## 2021-08-02 PROCEDURE — 63600175 PHARM REV CODE 636 W HCPCS: Performed by: INTERNAL MEDICINE

## 2021-08-02 RX ORDER — ACETAMINOPHEN 325 MG/1
650 TABLET ORAL ONCE AS NEEDED
Status: ACTIVE | OUTPATIENT
Start: 2021-08-02 | End: 2032-12-29

## 2021-08-02 RX ORDER — DIPHENHYDRAMINE HYDROCHLORIDE 50 MG/ML
25 INJECTION INTRAMUSCULAR; INTRAVENOUS ONCE AS NEEDED
Status: ACTIVE | OUTPATIENT
Start: 2021-08-02 | End: 2032-12-29

## 2021-08-02 RX ORDER — SODIUM CHLORIDE 0.9 % (FLUSH) 0.9 %
10 SYRINGE (ML) INJECTION
Status: ACTIVE | OUTPATIENT
Start: 2021-08-02

## 2021-08-02 RX ORDER — ALBUTEROL SULFATE 90 UG/1
2 AEROSOL, METERED RESPIRATORY (INHALATION)
Status: ACTIVE | OUTPATIENT
Start: 2021-08-02

## 2021-08-02 RX ORDER — EPINEPHRINE 0.3 MG/.3ML
0.3 INJECTION SUBCUTANEOUS
Status: ACTIVE | OUTPATIENT
Start: 2021-08-02

## 2021-08-02 RX ORDER — ONDANSETRON 4 MG/1
4 TABLET, ORALLY DISINTEGRATING ORAL ONCE AS NEEDED
Status: ACTIVE | OUTPATIENT
Start: 2021-08-02 | End: 2032-12-29

## 2021-08-02 RX ADMIN — CASIRIVIMAB AND IMDEVIMAB 600 MG: 600; 600 INJECTION, SOLUTION, CONCENTRATE INTRAVENOUS at 10:08
